# Patient Record
Sex: MALE | Race: WHITE | NOT HISPANIC OR LATINO | URBAN - METROPOLITAN AREA
[De-identification: names, ages, dates, MRNs, and addresses within clinical notes are randomized per-mention and may not be internally consistent; named-entity substitution may affect disease eponyms.]

---

## 2024-05-14 ENCOUNTER — EMERGENCY (EMERGENCY)
Facility: HOSPITAL | Age: 24
LOS: 1 days | Discharge: HOME | End: 2024-05-14
Attending: EMERGENCY MEDICINE
Payer: SELF-PAY

## 2024-05-14 VITALS
HEIGHT: 67 IN | HEART RATE: 83 BPM | DIASTOLIC BLOOD PRESSURE: 71 MMHG | SYSTOLIC BLOOD PRESSURE: 129 MMHG | TEMPERATURE: 98 F | RESPIRATION RATE: 18 BRPM | OXYGEN SATURATION: 97 % | WEIGHT: 153 LBS

## 2024-05-14 VITALS
HEART RATE: 63 BPM | DIASTOLIC BLOOD PRESSURE: 84 MMHG | SYSTOLIC BLOOD PRESSURE: 135 MMHG | OXYGEN SATURATION: 100 % | RESPIRATION RATE: 16 BRPM

## 2024-05-14 PROCEDURE — 99283 EMERGENCY DEPT VISIT LOW MDM: CPT

## 2024-05-14 PROCEDURE — 73562 X-RAY EXAM OF KNEE 3: CPT | Mod: 26,LT

## 2024-05-14 PROCEDURE — 99053 MED SERV 10PM-8AM 24 HR FAC: CPT

## 2024-05-14 PROCEDURE — 73562 X-RAY EXAM OF KNEE 3: CPT

## 2024-05-14 PROCEDURE — 99283 EMERGENCY DEPT VISIT LOW MDM: CPT | Mod: 25

## 2024-05-14 NOTE — ED PROVIDER NOTE - EXTREMITY EXAM
L knee with minimal swelling to superior aspect knee just above patella with mild overlying ttp. No medial/lateral joint line ttp. No other bony joint/muscular ttp of extremities. Full AROM UE/LE b/l with 5/5 strength. Sensation intact. 2+ distal pulses full/equal b/l.
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
## 2024-05-14 NOTE — ED PROVIDER NOTE - PATIENT PORTAL LINK FT
You can access the FollowMyHealth Patient Portal offered by [facility] by registering at the following website: [email or web address]. By joining [facility]’s FollowMyHealth portal, you will also be able to view your health information using other applications (apps) compatible with our system.
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
## 2024-05-14 NOTE — ED PROVIDER NOTE - PROGRESS NOTE DETAILS
X-ray negative for acute pathology.  Patient ambulatory without difficulty.  Patient counseled on supportive measures, need to follow-up with department physician/PMD and strict return precautions.  All questions answered, stable for discharge.  ED attending aware.- Duke De Los Santos PA-C

## 2024-05-14 NOTE — ED PROVIDER NOTE - NSFOLLOWUPINSTRUCTIONS_ED_ALL_ED_FT
Follow with your primary doctor or your department's physician within 1 week for re-evaluation.  Additionally you may follow up with our sports medicine clinic if your symptoms persist.     Rest. Apply cold pack for 20 minutes multiple times daily and elevate the extremity intermittently throughout the day.     For pain, take Tylenol (3 regular strength 325 mg tabs or 2 Extra strength 500 mg tablets) every 8 hours as needed. Take Motrin 600 mg every 8 hours as needed for additional pain relief.     If you develop any new/worsening symptoms including (but not limited to) worsening pain, difficulty walking, weakness, numbness/tingling, or any other concerning symptoms, please return to the Emergency Department immediately.

## 2024-05-14 NOTE — ED PROVIDER NOTE - NS CPE EDP MUSC CERVICAL LOC
No step-offs or deformities.  No midline vertebral tenderness.  No paraspinal tenderness.  Full AROM.
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
## 2024-05-14 NOTE — ED PROVIDER NOTE - NSFOLLOWUPCLINICS_GEN_ALL_ED_FT
[facility]  Sports Medicine  [address] [place] [address]  Phone: [phone]  Fax:   Follow Up Time: 4-6 Days

## 2024-05-15 ENCOUNTER — APPOINTMENT (OUTPATIENT)
Dept: ORTHOPEDIC SURGERY | Facility: CLINIC | Age: 24
End: 2024-05-15
Payer: OTHER MISCELLANEOUS

## 2024-05-15 VITALS — BODY MASS INDEX: 24.01 KG/M2 | HEIGHT: 67 IN | WEIGHT: 153 LBS

## 2024-05-15 DIAGNOSIS — S80.02XA CONTUSION OF LEFT KNEE, INITIAL ENCOUNTER: ICD-10-CM

## 2024-05-15 DIAGNOSIS — M25.562 PAIN IN LEFT KNEE: ICD-10-CM

## 2024-05-15 PROBLEM — Z00.00 ENCOUNTER FOR PREVENTIVE HEALTH EXAMINATION: Status: ACTIVE | Noted: 2024-05-15

## 2024-05-15 PROCEDURE — 99203 OFFICE O/P NEW LOW 30 MIN: CPT

## 2024-05-15 PROCEDURE — 73562 X-RAY EXAM OF KNEE 3: CPT | Mod: LT
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
## 2024-05-15 NOTE — ASSESSMENT
[FreeTextEntry1] : discussed possibility of MRI; will try HEP, ice, rest and OTC Motrin. If sx persist will pursue the MRI. Note given to stay out of work for one week.

## 2024-06-05 ENCOUNTER — APPOINTMENT (OUTPATIENT)
Dept: ORTHOPEDIC SURGERY | Facility: CLINIC | Age: 24
End: 2024-06-05

## 2024-09-02 NOTE — ED ADULT NURSE NOTE - HOW OFTEN DO YOU HAVE A DRINK CONTAINING ALCOHOL?
Never [] : Fellow [Time Spent: ___ minutes] : I have spent [unfilled] minutes of time on the encounter which excludes teaching and separately reported services.